# Patient Record
Sex: FEMALE | Race: WHITE | NOT HISPANIC OR LATINO | Employment: UNEMPLOYED | RURAL
[De-identification: names, ages, dates, MRNs, and addresses within clinical notes are randomized per-mention and may not be internally consistent; named-entity substitution may affect disease eponyms.]

---

## 2021-12-13 ENCOUNTER — LAB (OUTPATIENT)
Dept: LAB | Facility: HOSPITAL | Age: 26
End: 2021-12-13

## 2021-12-13 ENCOUNTER — TELEPHONE (OUTPATIENT)
Dept: OBSTETRICS AND GYNECOLOGY | Facility: CLINIC | Age: 26
End: 2021-12-13

## 2021-12-13 DIAGNOSIS — O02.1 MISSED ABORTION: ICD-10-CM

## 2021-12-13 DIAGNOSIS — O02.1 MISSED ABORTION: Primary | ICD-10-CM

## 2021-12-13 PROCEDURE — 84702 CHORIONIC GONADOTROPIN TEST: CPT

## 2021-12-13 NOTE — TELEPHONE ENCOUNTER
Received phone call from Asia [address], lay midwife on Friday 12/10.  She states that her patient had a miscarriage in October of this year then got pregnant.  She went for an ultrasound and had a heartbeat but had an US last week that did not show a heartbeat.  She is wondering what to do.  Discussed diagnosis concerning for miscarriage.  Will obtain beta-hCG.  She called back this morning with patient information (was not in the system prior to creating account today).  Orders placed.    Yessy Romero MD  12/13/2021  12:25 CST

## 2021-12-14 ENCOUNTER — TELEPHONE (OUTPATIENT)
Dept: OBSTETRICS AND GYNECOLOGY | Facility: CLINIC | Age: 26
End: 2021-12-14

## 2021-12-14 LAB — HCG INTACT+B SERPL-ACNC: <0.1 MIU/ML

## 2021-12-14 NOTE — TELEPHONE ENCOUNTER
----- Message from Yessy Romero MD sent at 12/14/2021 12:58 PM CST -----  Please let her know that her beta-hCG indicates that she is not pregnant and has passed the miscarriage completely.

## 2021-12-14 NOTE — TELEPHONE ENCOUNTER
Patient called requesting an ultrasound. She does not believe she has lost the baby from the lab results. She requested a returned call at #[phone].    Thanks,  Heydi

## 2021-12-16 NOTE — TELEPHONE ENCOUNTER
I called discussed with patient that we could order ultrasound but she states she no longer desires to have ultrasound at this time.

## 2022-02-21 ENCOUNTER — LAB (OUTPATIENT)
Dept: LAB | Facility: HOSPITAL | Age: 27
End: 2022-02-21

## 2022-02-21 ENCOUNTER — OFFICE VISIT (OUTPATIENT)
Dept: OBSTETRICS AND GYNECOLOGY | Facility: CLINIC | Age: 27
End: 2022-02-21

## 2022-02-21 VITALS
DIASTOLIC BLOOD PRESSURE: 78 MMHG | SYSTOLIC BLOOD PRESSURE: 126 MMHG | HEIGHT: 61 IN | WEIGHT: 156 LBS | BODY MASS INDEX: 29.45 KG/M2

## 2022-02-21 DIAGNOSIS — N93.9 VAGINAL BLEEDING: Primary | ICD-10-CM

## 2022-02-21 PROBLEM — O03.9 SAB (SPONTANEOUS ABORTION): Status: ACTIVE | Noted: 2022-02-21

## 2022-02-21 PROCEDURE — 84702 CHORIONIC GONADOTROPIN TEST: CPT | Performed by: NURSE PRACTITIONER

## 2022-02-21 PROCEDURE — 86900 BLOOD TYPING SEROLOGIC ABO: CPT | Performed by: NURSE PRACTITIONER

## 2022-02-21 PROCEDURE — 99202 OFFICE O/P NEW SF 15 MIN: CPT | Performed by: NURSE PRACTITIONER

## 2022-02-21 PROCEDURE — 86901 BLOOD TYPING SEROLOGIC RH(D): CPT | Performed by: NURSE PRACTITIONER

## 2022-02-21 NOTE — PROGRESS NOTES
Subjective   Cecy Ansari is a 26 y.o. here for vaginal bleeding, ultrasound    Cecy Ansari is a 26 yr old  who presents today accompanied by her aunt, Brenda. Pt c/o of vaginal bleeding that started last night and continued on today and wants to know why she keeps having miscarriages. Denies pain. Bleeding is a little more today but manageable. LMP .  Reports periods occur monthly. Pt states she believes she had a positive pregnancy test on Tuesday and Thursday; test had a faint line. Pt has had 2 miscarriages, one was 12 weeks along 2021 and the other was an very early miscarriage. Pt is [employer].       The following portions of the patient's history were reviewed and updated as appropriate: allergies, current medications, past family history, past medical history, past social history, past surgical history and problem list.    Review of Systems   Constitutional: Negative for chills, fatigue and fever.   Respiratory: Negative for shortness of breath.    Cardiovascular: Negative for chest pain and palpitations.   Gastrointestinal: Negative for abdominal pain, constipation, diarrhea and nausea.   Genitourinary: Positive for vaginal bleeding. Negative for dysuria, frequency, menstrual problem, pelvic pressure, vaginal discharge and vaginal pain.   Skin: Negative for rash.   Neurological: Negative for headache.   Psychiatric/Behavioral: Negative for depressed mood.       Objective   Physical Exam  Vitals and nursing note reviewed.   Constitutional:       Appearance: Normal appearance.   Pulmonary:      Effort: Pulmonary effort is normal.   Neurological:      Mental Status: She is alert.   Psychiatric:         Mood and Affect: Mood normal.         Behavior: Behavior normal.           Assessment/Plan   Diagnoses and all orders for this visit:    1. Vaginal bleeding (Primary)  -     hCG, Quantitative, Pregnancy  -     ABO / Rh      Discussed vaginal bleeding may be menses or possible early  pregnancy. Discussed that I would like to confirmed with HCG quant at this time and follow-up accordingly. Counseled that an ultrasound is not indicated at this time as pt is not having pain and a pregnancy has not been established yet. Discussed possible causes for recurrent pregnancy loss that would require further work-up at a later time. Will call patient with results of HCG and blood type when available. Bleeding precautions given. Pt verbalizes understanding of plan of care at this time.

## 2022-02-22 LAB
ABO GROUP BLD: NORMAL
HCG INTACT+B SERPL-ACNC: <0.5 MIU/ML
RH BLD: POSITIVE

## 2022-02-23 ENCOUNTER — TELEPHONE (OUTPATIENT)
Dept: OBSTETRICS AND GYNECOLOGY | Facility: CLINIC | Age: 27
End: 2022-02-23

## 2022-02-23 DIAGNOSIS — Z32.01 POSITIVE URINE PREGNANCY TEST: Primary | ICD-10-CM

## 2022-02-23 NOTE — TELEPHONE ENCOUNTER
PATIENT CALLED AND WAS CHECKING ON HER LAB RESULTS. HER NUMBER TO CALL BACK -[phone].            THANK YOU,        [place]
